# Patient Record
Sex: MALE | Race: BLACK OR AFRICAN AMERICAN | NOT HISPANIC OR LATINO | URBAN - METROPOLITAN AREA
[De-identification: names, ages, dates, MRNs, and addresses within clinical notes are randomized per-mention and may not be internally consistent; named-entity substitution may affect disease eponyms.]

---

## 2020-10-30 ENCOUNTER — EMERGENCY (EMERGENCY)
Facility: HOSPITAL | Age: 40
LOS: 1 days | Discharge: HOME | End: 2020-10-30
Attending: EMERGENCY MEDICINE | Admitting: EMERGENCY MEDICINE
Payer: COMMERCIAL

## 2020-10-30 VITALS
SYSTOLIC BLOOD PRESSURE: 146 MMHG | OXYGEN SATURATION: 100 % | TEMPERATURE: 98 F | HEART RATE: 64 BPM | RESPIRATION RATE: 16 BRPM | DIASTOLIC BLOOD PRESSURE: 70 MMHG

## 2020-10-30 PROCEDURE — 99282 EMERGENCY DEPT VISIT SF MDM: CPT

## 2020-10-30 NOTE — ED PROVIDER NOTE - PATIENT PORTAL LINK FT
You can access the FollowMyHealth Patient Portal offered by [facility] by registering at the following website: [email or web address]. By joining [employer]’s FollowMyHealth portal, you will also be able to view your health information using other applications (apps) compatible with our system.

## 2020-10-30 NOTE — ED PROVIDER NOTE - NS ED ROS FT
Gen: No fever, normal appetite  Eyes: No eye irritation or discharge  ENT: No ear pain, congestion, sore throat, (+) pain at tooth # 16  Resp: No cough or trouble breathing  Cardiovascular: No chest pain or palpitation  Gastroenteric: No nausea/vomiting, diarrhea, constipation  :  No change in urine output; no dysuria  MS: No joint or muscle pain  Skin: No rashes  Neuro: No headache; no abnormal movements  Remainder negative, except as per the HPI

## 2020-10-30 NOTE — ED PROVIDER NOTE - NSFOLLOWUPINSTRUCTIONS_ED_ALL_ED_FT
You were seen today for tooth pain in the ED. Although there is no reason for acute intervention, it is important that you follow up with a dentist to manage your cavities as soon as possible.    You should follow up with your dentist or our dental clinic at your earliest convenience in order to better manage this tooth pain. You were seen today for tooth pain in the ED. Although there is no reason for acute intervention, it is important that you follow up with a dentist to manage your cavities as soon as possible.    You can take tylenol and motrin as directed for your pain.     You should follow up with your dentist or our dental clinic at your earliest convenience in order to better manage this tooth pain. The number is below.

## 2020-10-30 NOTE — ED PROVIDER NOTE - ATTENDING CONTRIBUTION TO CARE
DR. BLOCH, ATTENDING MD-  I performed a face to face bedside interview with patient regarding history of present illness, review of symptoms and past medical history. I completed an independent physical exam.  I have discussed patient's plan of care with the resident.  Patient examined well appearing NAD HEENT 3rd molar with large cavity, no evidence of abscess, no trismus, no cervical adenopathy. no facial swelling. s1s2, no mgr. lungs clear.

## 2020-10-30 NOTE — ED PROVIDER NOTE - OBJECTIVE STATEMENT
40M here today for dental pain. States that the pain started yesterday at tooth #16 and was partially responsive to motrin, taken once last night at 1AM and once this morning at 10AM. The patient cannot identify any triggers for the pain but states when he presses the tooth in a certain position it aggravates the pain. Last dental  eval last year.

## 2020-10-30 NOTE — PROGRESS NOTE ADULT - SUBJECTIVE AND OBJECTIVE BOX
Dental evaluation for Upper left wisdom tooth pain.     *INTERVAL HPI/OVERNIGHT EVENTS:  40M here today for dental pain. States that the pain started yesterday at tooth #16 and was partially responsive to motrin, taken once last night at 1AM and once this morning at 10AM. The patient cannot identify any triggers for the pain but states when he presses the tooth in a certain position it aggravates the pain. Last dental  eval last year    MEDICATIONS  (STANDING): none    MEDICATIONS  (PRN): Motrin      Allergies    No Known Allergies    Intolerances        Vital Signs Last 24 Hrs  T(C): 36.7 (30 Oct 2020 12:05), Max: 36.7 (30 Oct 2020 12:05)  T(F): 98.1 (30 Oct 2020 12:05), Max: 98.1 (30 Oct 2020 12:05)  HR: 64 (30 Oct 2020 12:05) (64 - 64)  BP: 146/70 (30 Oct 2020 12:05) (146/70 - 146/70)  BP(mean): --  RR: 16 (30 Oct 2020 12:05) (16 - 16)  SpO2: 100% (30 Oct 2020 12:05) (100% - 100%)        CLINICAL EXAM: No acute clinical signs of infection. No swelling or abscess noted at this time.     DENTAL RADIOGRAPHS: 1 panoramic radiograph acquired and interpreted    ASSESSMENT: Extensive caries noted on tooth #16.   PLAN: OTC pain medications. Recommended to patient that the tooth should be extracted, but cannot be extracted today in the ED setting. If patient does not have outpatient dentist, he can call [facility] for emergency appointment.    PROCEDURE:  Limited oral evaluation and panoramic radiograph.     RECOMMENDATIONS:  1) OTC pain medications, as per med team.  2) Recommended to patient that the tooth should be extracted, but cannot be extracted today in the ED setting. If patient does not have outpatient dentist, he can call [facility] for emergency appointment.  3) F/U with outpatient dentist for comprehensive dental care upon discharge.  4) If swelling, fever, difficulty breathing/swallowing occurs, page Dental.     Jeni Aldrich DDS, #[id number]

## 2020-10-30 NOTE — ED PROVIDER NOTE - PHYSICAL EXAMINATION
Gen: WDWN, NAD  HEENT: EOMI, no nasal discharge, mucous membranes moist; degeneration at tooth 16, some swelling of the buccal mucosa at the site  CV: RRR, +S1/S2, no M/R/G  Resp: CTAB, no W/R/R  GI: Abdomen soft non-distended, NTTP, no masses  MSK: No open wounds, no bruising, no LE edema  Neuro: A&Ox4, following commands, moving all four extremities spontaneously  Psych: appropriate mood, denies AH, VH, SI

## 2020-10-30 NOTE — ED PROVIDER NOTE - CLINICAL SUMMARY MEDICAL DECISION MAKING FREE TEXT BOX
40M here today for 1 day history of tooth pain partially responsive to motrin. Low suspicion for abscess or dental fracture. Concern for acutely irritated tooth decay.